# Patient Record
Sex: MALE | Race: AMERICAN INDIAN OR ALASKA NATIVE
[De-identification: names, ages, dates, MRNs, and addresses within clinical notes are randomized per-mention and may not be internally consistent; named-entity substitution may affect disease eponyms.]

---

## 2022-08-17 NOTE — ELECTROCARDIOGRAPH REPORT
[facility]

                                       

Test Date:    2022               Test Time:    07:41:18

Pat Name:     FRANKO GONZALES         Department:   

Patient ID:   [id number]          Room:          

Gender:       M                        Technician:   TV

:          1945               Requested By: STEFANI CALLOWAY

Order Number: [id number]             Reading MD:   Leon Gomez

                                 Measurements

Intervals                              Axis          

Rate:         44                       P:            48

KY:           101                      QRS:          15

QRSD:         108                      T:            61

QT:           498                                    

QTc:          425                                    

                           Interpretive Statements

Sinus bradycardia

MOBITZ I AV BLOCK (WENCKEBACH

Anteroseptal infarct, age indeterminate

No previous ECG available for comparison

Electronically Signed On 2022 13:21:24 EDT by Leon Gomez

## 2022-08-17 NOTE — EMERGENCY DEPARTMENT REPORT
ED Palpitations HPI





- General


Chief Complaint: Arrhythmia/Palpitations


Stated Complaint: BRADYCARDIA


Time Seen by Provider: 08/17/22 07:56


Source: patient, EMS


Mode of arrival: Stretcher


Limitations: No Limitations





- History of Present Illness


Initial Comments: 





75 yo M with history of ESRD currently on dialysis who was sent to the ED for 

bradycardia noted on the monitor while his was been dialyzed. Pt denies any 

unusual sob or palpitation or chest pain. No other modifying or associated 

factors. 





- Related Data


                                    Allergies











Allergy/AdvReac Type Severity Reaction Status Date / Time


 


No Known Allergies Allergy   Verified 08/17/22 07:37














ED Review of Systems


ROS: 


Stated complaint: BRADYCARDIA


Other details as noted in HPI





Comment: All other systems reviewed and negative


Cardiovascular: palpitations, other (bradycardia)





ED Past Medical Hx





- Past Medical History


Hx Diabetes: Yes


Additional medical history: dialysis





- Social History


Smoking Status: Never Smoker





ED Physical Exam





- General


Limitations: No Limitations


General appearance: alert, in no apparent distress





- Head


Head exam: Present: normal inspection





- Eye


Eye exam: Present: normal appearance


Pupils: Present: normal accommodation





- ENT


ENT exam: Present: normal exam, normal orophraynx, mucous membranes dry





- Neck


Neck exam: Present: normal inspection, full ROM.  Absent: tenderness





- Respiratory


Respiratory exam: Present: normal lung sounds bilaterally.  Absent: respiratory 

distress, accessory muscle use





- Cardiovascular


Cardiovascular Exam: Present: bradycardia





- GI/Abdominal


GI/Abdominal exam: Present: soft, normal bowel sounds.  Absent: distended, 

tenderness





- Extremities Exam


Extremities exam: Present: normal inspection, normal capillary refill.  Absent: 

full ROM, tenderness, pedal edema





- Back Exam


Back exam: Absent: tenderness





- Neurological Exam


Neurological exam: Present: alert, oriented X3





- Psychiatric


Psychiatric exam: Present: normal affect, normal mood





- Skin


Skin exam: Present: warm, normal color





ED Course


                                   Vital Signs











  08/17/22 08/17/22 08/17/22





  07:35 07:46 08:00


 


Pulse Rate 52 L 47 L 53 L


 


Respiratory 14 15 18





Rate   


 


Blood Pressure   113/69


 


Blood Pressure 124/72  





[Left]   


 


O2 Sat by Pulse 94 92 87





Oximetry   














  08/17/22 08/17/22





  08:16 08:30


 


Pulse Rate 68 


 


Respiratory 21 





Rate  


 


Blood Pressure 113/69 


 


Blood Pressure  





[Left]  


 


O2 Sat by Pulse 99 99





Oximetry  














ED Medical Decision Making





- Lab Data


Result diagrams: 


                                 08/17/22 09:57





                                 08/17/22 09:57





- EKG Data


-: EKG Interpreted by Me


EKG shows normal: sinus rhythm


Rate: bradycardia





- EKG Data


Interpretation: no acute changes, nonspecific ST-T wave balaji





- Medical Decision Making





here with bradycardia during dialysis -- unsure the cause but could be as a 

result of dehydration --to find out will go ahead and order CBC, CMP and UA for 

any electrolyte abnormality or infectious process as a cause--will also check 

thyroid panel-- 





Noted with unremarkable labs including renal functions--pt reassured and d/c 

home to close follow up with his PCP-- 


Critical care attestation.: 


If time is entered above; I have spent that time in minutes in the direct care 

of this critically ill patient, excluding procedure time.








ED Disposition


Clinical Impression: 


 Bradycardia





Disposition: 01 HOME / SELF CARE / HOMELESS


Is pt being admited?: No


Does the pt Need Aspirin: No


Condition: Stable


Instructions:  Bradycardia, Adult


Additional Instructions: 


Keep your dialysis appointment as instructed and prescribed by your nephrologist





Increase your daily fluid to help your hydration





Please do not hesitate to call or return to emergency room if your symptoms 

worsen


Referrals: 


KRISTEN DOMINGUEZ MD [Referring] - 3-5 Days


Time of Disposition: 13:44

## 2022-09-12 ENCOUNTER — HOSPITAL ENCOUNTER (EMERGENCY)
Dept: HOSPITAL 5 - ED | Age: 77
Discharge: HOME | End: 2022-09-12
Payer: MEDICARE

## 2022-09-12 VITALS — DIASTOLIC BLOOD PRESSURE: 85 MMHG | SYSTOLIC BLOOD PRESSURE: 203 MMHG

## 2022-09-12 DIAGNOSIS — Z99.2: ICD-10-CM

## 2022-09-12 DIAGNOSIS — Z79.899: ICD-10-CM

## 2022-09-12 DIAGNOSIS — G89.29: ICD-10-CM

## 2022-09-12 DIAGNOSIS — Z79.01: ICD-10-CM

## 2022-09-12 DIAGNOSIS — I12.0: Primary | ICD-10-CM

## 2022-09-12 DIAGNOSIS — E11.22: ICD-10-CM

## 2022-09-12 DIAGNOSIS — N18.6: ICD-10-CM

## 2022-09-12 LAB
ALBUMIN SERPL-MCNC: 4.2 G/DL (ref 3.9–5)
ALT SERPL-CCNC: 12 UNITS/L (ref 7–56)
BASOPHILS # (AUTO): 0 K/MM3 (ref 0–0.1)
BASOPHILS NFR BLD AUTO: 0.6 % (ref 0–1.8)
BUN SERPL-MCNC: 46 MG/DL (ref 9–20)
BUN/CREAT SERPL: 4 %
CALCIUM SERPL-MCNC: 10.8 MG/DL (ref 8.4–10.2)
EOSINOPHIL # BLD AUTO: 0.2 K/MM3 (ref 0–0.4)
EOSINOPHIL NFR BLD AUTO: 3.6 % (ref 0–4.3)
HCT VFR BLD CALC: 41.6 % (ref 35.5–45.6)
HEMOLYSIS INDEX: 4
HGB BLD-MCNC: 12.3 GM/DL (ref 11.8–15.2)
LYMPHOCYTES # BLD AUTO: 0.9 K/MM3 (ref 1.2–5.4)
LYMPHOCYTES NFR BLD AUTO: 19.3 % (ref 13.4–35)
MCHC RBC AUTO-ENTMCNC: 30 % (ref 32–34)
MCV RBC AUTO: 84 FL (ref 84–94)
MONOCYTES # (AUTO): 0.3 K/MM3 (ref 0–0.8)
MONOCYTES % (AUTO): 7.4 % (ref 0–7.3)
PLATELET # BLD: 160 K/MM3 (ref 140–440)
RBC # BLD AUTO: 4.97 M/MM3 (ref 3.65–5.03)

## 2022-09-12 PROCEDURE — 74176 CT ABD & PELVIS W/O CONTRAST: CPT

## 2022-09-12 PROCEDURE — 80053 COMPREHEN METABOLIC PANEL: CPT

## 2022-09-12 PROCEDURE — 85025 COMPLETE CBC W/AUTO DIFF WBC: CPT

## 2022-09-12 PROCEDURE — 71045 X-RAY EXAM CHEST 1 VIEW: CPT

## 2022-09-12 PROCEDURE — 36415 COLL VENOUS BLD VENIPUNCTURE: CPT

## 2022-09-12 PROCEDURE — 99284 EMERGENCY DEPT VISIT MOD MDM: CPT

## 2022-09-12 PROCEDURE — 83690 ASSAY OF LIPASE: CPT

## 2022-09-12 NOTE — EMERGENCY DEPARTMENT REPORT
ED Abdominal Pain HPI





- General


Chief Complaint: Abdominal Pain


Stated Complaint: PAIN/RT ABD PAIN/RIB CAGE


Time Seen by Provider: 09/12/22 10:32


Source: patient, EMS


Mode of arrival: Stretcher


Limitations: No Limitations





- History of Present Illness


Initial Comments: 





76-year-old male with a past medical history of diabetes, end-stage renal 

disease on dialysis Monday, Wednesday, Friday. sleep apeniaand hypertension 

presents to the hospital with complaints of right flank pain 3 days.  Patient 

apparently was at [facility] [place] with similar complaints with unclear work up and discharge

diagnosis.  Patient denies nausea, vomiting, or fever.  Pain is constant, mod

erate in intensity, worse with movement and palpation.  No alleviating factors 

reported.  Patient was sent here from dialysis and did not receive dialysis 

today. He has not take his meds today.  His nephrologist Dr. Palomares


Severity scale (0 -10): 10





- Related Data


                                Home Medications











 Medication  Instructions  Recorded  Confirmed  Last Taken


 


Apixaban [Eliquis] 5 mg PO DAILY 09/12/22 09/12/22 Unknown


 


NIFEdipine [Nifedipine ER] 60 mg PO DAILY 09/12/22 09/12/22 Unknown


 


Nortriptyline HCl 50 mg PO DAILY 09/12/22 09/12/22 Unknown


 


Pantoprazole [Protonix TAB] 40 mg PO DAILY 09/12/22 09/12/22 Unknown


 


Sevelamer Carbonate [Renvela] 800 mg PO TIDWM 09/12/22 09/12/22 Unknown


 


Tamsulosin [Flomax] 0.4 mg PO QDAY 09/12/22 09/12/22 Unknown








                                  Previous Rx's











 Medication  Instructions  Recorded  Last Taken  Type


 


HYDROcodone/APAP 5-325 [Mancelona 1 each PO Q6HR PRN #10 tablet 09/12/22 Unknown Rx





5/325]    











                                    Allergies











Allergy/AdvReac Type Severity Reaction Status Date / Time


 


No Known Allergies Allergy   Verified 09/12/22 10:38














ED Review of Systems


ROS: 


Stated complaint: PAIN/RT ABD PAIN/RIB CAGE


Other details as noted in HPI





Comment: All other systems reviewed and negative





ED Past Medical Hx





- Past Medical History


Hx Diabetes: Yes


Additional medical history: dialysis





- Social History


Smoking Status: Never Smoker





- Medications


Home Medications: 


                                Home Medications











 Medication  Instructions  Recorded  Confirmed  Last Taken  Type


 


Apixaban [Eliquis] 5 mg PO DAILY 09/12/22 09/12/22 Unknown History


 


HYDROcodone/APAP 5-325 [Mancelona 1 each PO Q6HR PRN #10 tablet 09/12/22  Unknown Rx





5/325]     


 


NIFEdipine [Nifedipine ER] 60 mg PO DAILY 09/12/22 09/12/22 Unknown History


 


Nortriptyline HCl 50 mg PO DAILY 09/12/22 09/12/22 Unknown History


 


Pantoprazole [Protonix TAB] 40 mg PO DAILY 09/12/22 09/12/22 Unknown History


 


Sevelamer Carbonate [Renvela] 800 mg PO TIDWM 09/12/22 09/12/22 Unknown History


 


Tamsulosin [Flomax] 0.4 mg PO QDAY 09/12/22 09/12/22 Unknown History














ED Physical Exam





- General


Limitations: No Limitations





- Other


Other exam information: 





General: No acute distress


Head: Atraumatic


Eyes: normal appearance


ENT: Moist mucous membranes


Neck: Normal appearance, no midline tenderness


Chest: Clear to auscultation bilaterally


CV: Regular rate and rhythm


Abdomen: Soft, normal bowel sounds, right middle flank pain/lateral abdominal 

pain to palpation.  Also worsens with movement


Back: Normal inspection


Extremity: Normal inspection, full range of motion


Neuro: Alert chronic right sided weakness compared to left as per patient


Psych: Appropriate behavior


Skin: No rash





ED Course


                                   Vital Signs











  09/12/22 09/12/22 09/12/22





  10:27 11:29 11:31


 


Temperature 98.1 F  


 


Pulse Rate 64  


 


Respiratory 18  





Rate   


 


Blood Pressure   197/89


 


Blood Pressure 180/86  





[Left]   


 


O2 Sat by Pulse 95 95 94





Oximetry   














  09/12/22 09/12/22 09/12/22





  11:46 12:00 12:36


 


Temperature   


 


Pulse Rate   


 


Respiratory   





Rate   


 


Blood Pressure  197/89 197/89


 


Blood Pressure   





[Left]   


 


O2 Sat by Pulse 85 85 97





Oximetry   














  09/12/22 09/12/22 09/12/22





  12:45 13:01 13:15


 


Temperature   


 


Pulse Rate   


 


Respiratory   





Rate   


 


Blood Pressure 203/70 203/70 199/69


 


Blood Pressure   





[Left]   


 


O2 Sat by Pulse 95 96 98





Oximetry   














  09/12/22 09/12/22 09/12/22





  13:31 13:45 14:01


 


Temperature   


 


Pulse Rate   


 


Respiratory   





Rate   


 


Blood Pressure 199/69 200/77 205/81


 


Blood Pressure   





[Left]   


 


O2 Sat by Pulse 92 87 92





Oximetry   














  09/12/22 09/12/22 09/12/22





  14:15 14:31 14:45


 


Temperature   


 


Pulse Rate   


 


Respiratory   





Rate   


 


Blood Pressure 206/82 206/82 207/76


 


Blood Pressure   





[Left]   


 


O2 Sat by Pulse 88 90 93





Oximetry   














  09/12/22 09/12/22 09/12/22





  16:09 16:11 16:13


 


Temperature   


 


Pulse Rate   


 


Respiratory   





Rate   


 


Blood Pressure 225/89 225/89 216/84


 


Blood Pressure   





[Left]   


 


O2 Sat by Pulse 89 83 L 90





Oximetry   














  09/12/22 09/12/22 09/12/22





  16:15 16:17 16:19


 


Temperature   


 


Pulse Rate   


 


Respiratory   





Rate   


 


Blood Pressure 216/84 228/82 225/106


 


Blood Pressure   





[Left]   


 


O2 Sat by Pulse 93 89 89





Oximetry   














  09/12/22 09/12/22 09/12/22





  16:21 16:23 16:25


 


Temperature   


 


Pulse Rate   


 


Respiratory   





Rate   


 


Blood Pressure 225/106 225/101 225/101


 


Blood Pressure   





[Left]   


 


O2 Sat by Pulse 86 83 L 90





Oximetry   














  09/12/22 09/12/22 09/12/22





  16:27 16:29 16:31


 


Temperature   


 


Pulse Rate   


 


Respiratory   





Rate   


 


Blood Pressure 225/101 225/101 225/101


 


Blood Pressure   





[Left]   


 


O2 Sat by Pulse 83 L 89 94





Oximetry   














  09/12/22 09/12/22 09/12/22





  16:33 16:35 16:37


 


Temperature   


 


Pulse Rate   


 


Respiratory   





Rate   


 


Blood Pressure 225/101 225/101 225/101


 


Blood Pressure   





[Left]   


 


O2 Sat by Pulse 84 90 98





Oximetry   














  09/12/22 09/12/22 09/12/22





  16:39 16:41 16:43


 


Temperature   


 


Pulse Rate   


 


Respiratory   





Rate   


 


Blood Pressure 225/101 225/101 222/87


 


Blood Pressure   





[Left]   


 


O2 Sat by Pulse 98 87 98





Oximetry   














  09/12/22 09/12/22 09/12/22





  16:45 16:47 16:49


 


Temperature   


 


Pulse Rate   


 


Respiratory   





Rate   


 


Blood Pressure 222/87 222/87 222/87


 


Blood Pressure   





[Left]   


 


O2 Sat by Pulse 98 83 L 87





Oximetry   














  09/12/22 09/12/22 09/12/22





  16:51 16:53 16:55


 


Temperature   


 


Pulse Rate   


 


Respiratory   





Rate   


 


Blood Pressure 222/87 222/87 222/87


 


Blood Pressure   





[Left]   


 


O2 Sat by Pulse 94 96 92





Oximetry   














  09/12/22 09/12/22 09/12/22





  16:57 16:59 17:01


 


Temperature   


 


Pulse Rate   


 


Respiratory   





Rate   


 


Blood Pressure 222/87 222/87 222/87


 


Blood Pressure   





[Left]   


 


O2 Sat by Pulse 84 88 86





Oximetry   














  09/12/22 09/12/22 09/12/22





  17:03 17:04 17:07


 


Temperature   


 


Pulse Rate   


 


Respiratory   





Rate   


 


Blood Pressure 193/81 193/81 193/81


 


Blood Pressure   





[Left]   


 


O2 Sat by Pulse 88 95 81 L





Oximetry   














  09/12/22 09/12/22 09/12/22





  17:09 17:11 17:13


 


Temperature   


 


Pulse Rate   


 


Respiratory   





Rate   


 


Blood Pressure 193/81 193/81 193/81


 


Blood Pressure   





[Left]   


 


O2 Sat by Pulse 94 94 83 L





Oximetry   














  09/12/22 09/12/22 09/12/22





  17:15 17:17 17:19


 


Temperature   


 


Pulse Rate   


 


Respiratory   





Rate   


 


Blood Pressure 193/81 193/81 193/81


 


Blood Pressure   





[Left]   


 


O2 Sat by Pulse 87 97 95





Oximetry   














  09/12/22 09/12/22 09/12/22





  17:21 17:23 17:24


 


Temperature   


 


Pulse Rate   


 


Respiratory   





Rate   


 


Blood Pressure 193/81 212/102 212/102


 


Blood Pressure   





[Left]   


 


O2 Sat by Pulse 85 91 87





Oximetry   














  09/12/22 09/12/22 09/12/22





  17:27 17:29 17:31


 


Temperature   


 


Pulse Rate   


 


Respiratory   





Rate   


 


Blood Pressure 212/102 212/102 212/102


 


Blood Pressure   





[Left]   


 


O2 Sat by Pulse 94 87 90





Oximetry   














  09/12/22 09/12/22 09/12/22





  17:33 17:35 17:37


 


Temperature   


 


Pulse Rate   


 


Respiratory   





Rate   


 


Blood Pressure 212/102 212/102 212/102


 


Blood Pressure   





[Left]   


 


O2 Sat by Pulse 92 78 L 88





Oximetry   














  09/12/22 09/12/22 09/12/22





  17:39 17:41 17:43


 


Temperature   


 


Pulse Rate   


 


Respiratory   





Rate   


 


Blood Pressure 212/102 212/102 204/101


 


Blood Pressure   





[Left]   


 


O2 Sat by Pulse 97 89 80 L





Oximetry   














  09/12/22 09/12/22





  17:45 17:46


 


Temperature  


 


Pulse Rate  


 


Respiratory  





Rate  


 


Blood Pressure 204/101 203/85


 


Blood Pressure  





[Left]  


 


O2 Sat by Pulse 88 93





Oximetry  














- Consultations


Consultation #1: 





09/12/22 


case d/w DR Blank, pt does not need emergent dialysis at this time





ED Medical Decision Making





- Lab Data


Result diagrams: 


                                 09/12/22 12:01





                                 09/12/22 12:01








                                   Lab Results











  09/12/22 09/12/22 Range/Units





  12:01 12:01 


 


WBC  4.6   (4.5-11.0)  K/mm3


 


RBC  4.97   (3.65-5.03)  M/mm3


 


Hgb  12.3   (11.8-15.2)  gm/dl


 


Hct  41.6   (35.5-45.6)  %


 


MCV  84   (84-94)  fl


 


MCH  25 L   (28-32)  pg


 


MCHC  30 L   (32-34)  %


 


RDW  18.7 H   (13.2-15.2)  %


 


Plt Count  160   (140-440)  K/mm3


 


Lymph % (Auto)  19.3   (13.4-35.0)  %


 


Mono % (Auto)  7.4 H   (0.0-7.3)  %


 


Eos % (Auto)  3.6   (0.0-4.3)  %


 


Baso % (Auto)  0.6   (0.0-1.8)  %


 


Lymph # (Auto)  0.9 L   (1.2-5.4)  K/mm3


 


Mono # (Auto)  0.3   (0.0-0.8)  K/mm3


 


Eos # (Auto)  0.2   (0.0-0.4)  K/mm3


 


Baso # (Auto)  0.0   (0.0-0.1)  K/mm3


 


Seg Neutrophils %  69.1   (40.0-70.0)  %


 


Seg Neutrophils #  3.2   (1.8-7.7)  K/mm3


 


Sodium   141  (137-145)  mmol/L


 


Potassium   4.6  (3.6-5.0)  mmol/L


 


Chloride   99.1  ()  mmol/L


 


Carbon Dioxide   28  (22-30)  mmol/L


 


Anion Gap   19  mmol/L


 


BUN   46 H  (9-20)  mg/dL


 


Creatinine   12.1 H  (0.8-1.3)  mg/dL


 


Estimated GFR   5  ml/min


 


BUN/Creatinine Ratio   4  %


 


Glucose   124 H  ()  mg/dL


 


Calcium   10.8 H  (8.4-10.2)  mg/dL


 


Total Bilirubin   0.40  (0.1-1.2)  mg/dL


 


AST   11  (5-40)  units/L


 


ALT   12  (7-56)  units/L


 


Alkaline Phosphatase   69  ()  units/L


 


Total Protein   6.2 L  (6.3-8.2)  g/dL


 


Albumin   4.2  (3.9-5)  g/dL


 


Albumin/Globulin Ratio   2.1  %


 


Lipase   27  (13-60)  units/L














- Radiology Data


Radiology results: report reviewed





CT ABDOMEN AND PELVIS WITHOUT CONTRAST  


 


 HISTORY: right flank pain  


 


 COMPARISON: None.  


 


 TECHNIQUE: Axial CT images were obtained through the abdomen and pelvis without

 IV contrast. 


Sagittal and coronal reformatted images. All CT scans at this location are perfo

rmed using CT dose 


reduction for ALARA by means of automated exposure control.  


 


 


 FINDINGS:  


 


 CT ABDOMEN:  


 Lung Bases: Borderline to mild cardiomegaly. Minor subpleural atelectatic 

changes are noted in both


lower lobes.  


 Liver: No significant abnormality.  


 Biliary: No significant abnormality.  


 Spleen: No significant abnormality.  Unenlarged.  


 Pancreas: No significant abnormality.  


 Adrenals: No significant abnormality.  


 Kidneys: The right kidney has been surgically removed. The left kidney is 

atrophic measuring 8 cm 


in length. An approximate 1 cm calcification is identified at the ureteropelvic 

junction in the left


kidney. There is no significant hydronephrosis. A 2 mm calyceal stone is 

identified at the superior 


pole of the left kidney. There is also a slightly complex cyst at the superior 

pole the left kidney 


containing thin rim calcifications.  


 Lymphatics: No lymphadenopathy.  


 Vasculature: Moderate atherosclerotic disease in the aorta and iliac arteries w

ithout acute 


abnormality.  


 Bowel/Peritoneum: No significant abnormality. No free air. No free fluid. The 

appendix is not 


confidently identified.  


 


 CT PELVIS:  


 : The bladder is unremarkable. There is mild prostatomegaly.  


 


 Osseous Structures: Grade 1 anterolisthesis of L4 with respect L5. Nothing 

acute.  


 Additional Findings: None  


 


 IMPRESSION:  


 No clear explanation for right flank pain. Right nephrectomy has been 

performed.  


 Atrophic left kidney containing stones as described. No hydronephrosis.  


 Other chronic incidental findings as described.  





- Medical Decision Making





76-year-old male presents to the hospital right flank/abdominal pain (not 

located at the ribs).  This pain is reproduced with movement and palpation.  

Patient had a unremarkable CAT scan with previous right nephrectomy noted.  

Incidental CT abdomen and pelvis findings noted.  Chest x-ray read as 

atelectasis versus infiltrate however, lower lung fields obtained on CT abdomen 

and pelvis revealed atelectasis and patient does not have a leukocytosis or 

fever to suggest infection.  Possible musculoskeletal cause of right flank pain 

since there are not any significant intra-abdominal organ findings on CT.  

Patient did not receive his dialysis however, he does not meet criteria for 

emergent dialysis at this time.  Case was discussed with his nephrology group.  

Patient had elevated hypertension during ED stay however, he did not have his BP

 medications and was provided a dose of hydralazine p.o. and his nifedipine 

prior to discharge.  Pain improved with Norco.  Patient will be discharged home 

with pain medication and outpatient follow-up.  Wife at the bedside


Critical Care Time: No


Critical care attestation.: 


If time is entered above; I have spent that time in minutes in the direct care 

of this critically ill patient, excluding procedure time.








ED Disposition


Clinical Impression: 


 Right flank pain, History of right nephrectomy, ESRD (end stage renal disease) 

on dialysis, Chronic hypertension





Disposition: 01 HOME / SELF CARE / HOMELESS


Is pt being admited?: No


Does the pt Need Aspirin: No


Condition: Stable


Instructions:  Hypertension (ED), Abdominal Pain, Adult


Additional Instructions: 


Take the medication as prescribed.  Follow-up with your doctor or doctor/clinic 

provided.  Return if symptoms worsen as indicated by your discharge 

instructions.


Prescriptions: 


HYDROcodone/APAP 5-325 [Mancelona 5/325] 1 each PO Q6HR PRN #10 tablet


 PRN Reason: Pain


Referrals: 


PRIMARY CARE,MD [Primary Care Provider] - 3-5 Days


Time of Disposition: 17:59

## 2022-09-12 NOTE — XRAY REPORT
CHEST 1 VIEW 9/12/2022 2:31 PM



INDICATION / CLINICAL INFORMATION: MISSED DIALYSIS, RIGHT FLANK PAIN.



COMPARISON: None available.



FINDINGS:



SUPPORT DEVICES: None.

HEART / MEDIASTINUM: Moderate cardiomegaly. The aorta is tortuous and ectatic. 

LUNGS / PLEURA: Mild basilar opacity left greater than right. No pneumothorax. 



ADDITIONAL FINDINGS: No significant additional findings.



IMPRESSION: Mild basilar opacity left greater than right. Atelectasis versus developing pneumonia.



Signer Name: Jeffrey Trinidad MD 

Signed: 9/12/2022 3:35 PM

Workstation Name: [id number]

## 2022-09-12 NOTE — CAT SCAN REPORT
CT ABDOMEN AND PELVIS WITHOUT CONTRAST



HISTORY: right flank pain



COMPARISON: None.



TECHNIQUE: Axial CT images were obtained through the abdomen and pelvis without IV contrast. Sagittal
 and coronal reformatted images. All CT scans at this location are performed using CT dose reduction 
for ALARA by means of automated exposure control.





FINDINGS:



CT ABDOMEN:

Lung Bases: Borderline to mild cardiomegaly. Minor subpleural atelectatic changes are noted in both l
ower lobes.

Liver: No significant abnormality.

Biliary: No significant abnormality.

Spleen: No significant abnormality.  Unenlarged.

Pancreas: No significant abnormality.

Adrenals: No significant abnormality.

Kidneys: The right kidney has been surgically removed. The left kidney is atrophic measuring 8 cm in 
length. An approximate 1 cm calcification is identified at the ureteropelvic junction in the left kid
segun. There is no significant hydronephrosis. A 2 mm calyceal stone is identified at the superior pole
 of the left kidney. There is also a slightly complex cyst at the superior pole the left kidney conta
ining thin rim calcifications.

Lymphatics: No lymphadenopathy.

Vasculature: Moderate atherosclerotic disease in the aorta and iliac arteries without acute abnormali
ty.

Bowel/Peritoneum: No significant abnormality. No free air. No free fluid. The appendix is not confide
ntly identified.



CT PELVIS:

: The bladder is unremarkable. There is mild prostatomegaly.



Osseous Structures: Grade 1 anterolisthesis of L4 with respect L5. Nothing acute.

Additional Findings: None



IMPRESSION:

No clear explanation for right flank pain. Right nephrectomy has been performed.

Atrophic left kidney containing stones as described. No hydronephrosis.

Other chronic incidental findings as described.



Signer Name: Tom Larson Jr, MD 

Signed: 9/12/2022 12:17 PM

Workstation Name: [id number]